# Patient Record
Sex: MALE | Race: BLACK OR AFRICAN AMERICAN | Employment: FULL TIME | URBAN - METROPOLITAN AREA
[De-identification: names, ages, dates, MRNs, and addresses within clinical notes are randomized per-mention and may not be internally consistent; named-entity substitution may affect disease eponyms.]

---

## 2019-09-26 ENCOUNTER — APPOINTMENT (OUTPATIENT)
Dept: GENERAL RADIOLOGY | Facility: HOSPITAL | Age: 64
End: 2019-09-26
Attending: EMERGENCY MEDICINE
Payer: OTHER MISCELLANEOUS

## 2019-09-26 ENCOUNTER — HOSPITAL ENCOUNTER (EMERGENCY)
Facility: HOSPITAL | Age: 64
Discharge: HOME | End: 2019-09-26
Attending: EMERGENCY MEDICINE
Payer: OTHER MISCELLANEOUS

## 2019-09-26 VITALS
HEIGHT: 71 IN | RESPIRATION RATE: 18 BRPM | TEMPERATURE: 97 F | HEART RATE: 72 BPM | SYSTOLIC BLOOD PRESSURE: 126 MMHG | OXYGEN SATURATION: 96 % | BODY MASS INDEX: 29.4 KG/M2 | DIASTOLIC BLOOD PRESSURE: 72 MMHG | WEIGHT: 210 LBS

## 2019-09-26 DIAGNOSIS — S61.309A AVULSION OF FINGERNAIL, INITIAL ENCOUNTER: ICD-10-CM

## 2019-09-26 DIAGNOSIS — S62.636A CLOSED DISPLACED FRACTURE OF DISTAL PHALANX OF RIGHT LITTLE FINGER, INITIAL ENCOUNTER: Primary | ICD-10-CM

## 2019-09-26 PROCEDURE — 99283 EMERGENCY DEPT VISIT LOW MDM: CPT

## 2019-09-26 PROCEDURE — 73140 X-RAY EXAM OF FINGER(S): CPT | Performed by: EMERGENCY MEDICINE

## 2019-09-26 PROCEDURE — 90471 IMMUNIZATION ADMIN: CPT

## 2019-09-26 RX ORDER — IBUPROFEN 400 MG/1
400 TABLET ORAL EVERY 8 HOURS PRN
Qty: 30 TABLET | Refills: 0 | Status: SHIPPED | OUTPATIENT
Start: 2019-09-26 | End: 2019-10-03

## 2019-09-26 RX ORDER — CEFADROXIL 500 MG/1
500 CAPSULE ORAL 2 TIMES DAILY
Qty: 10 CAPSULE | Refills: 0 | Status: SHIPPED | OUTPATIENT
Start: 2019-09-26 | End: 2019-10-01

## 2019-09-26 NOTE — ED INITIAL ASSESSMENT (HPI)
Pt presents to ed ambulatory with steady gait with an injury to the fifth digit of the right hand. Pt is a&ox4, moves all extremities well, resps easy.

## 2019-09-26 NOTE — ED PROVIDER NOTES
Patient Seen in: [facility] Emergency Department      History   Patient presents with:  Upper Extremity Injury (musculoskeletal)    Stated Complaint: pt c/o of right pink injury at work     HPI    40-year-old male complaint of right fifth finger radha back.  I did not see any obvious laceration of the nailbed that would have gone along with the patient's description that he reduce the fracture that was open. Tube gauze dressing was placed in a splint with the finger in extension was placed.         MDM

## 2019-09-27 PROBLEM — S62.609K: Status: ACTIVE | Noted: 2019-09-27

## 2019-09-27 PROBLEM — S69.91XA INJURY OF NAIL BED OF FINGER OF RIGHT HAND, INITIAL ENCOUNTER: Status: ACTIVE | Noted: 2019-09-27

## 2021-11-15 ENCOUNTER — NURSING HOME VISIT (OUTPATIENT)
Dept: REHABILITATION | Age: 66
End: 2021-11-15

## (undated) NOTE — ED AVS SNAPSHOT
Madie Pringle   MRN: [id number]    Department:  [facility] Emergency Department   Date of Visit:  9/26/2019           Disclosure     Insurance plans vary and the physician(s) referred by the ER may not be covered by your plan.  Please contact your i tell this physician (or your personal doctor if your instructions are to return to your personal doctor) about any new or lasting problems. The primary care or specialist physician will see patients referred from the [facility] Emergency Department.  Richard Hull

## (undated) NOTE — LETTER
September 26, 2019    Patient: Fredrick Silva   Date of Visit: 9/26/2019       To Whom It May Concern:    Winston Mendez was seen and treated in our emergency department on 9/26/2019.  He can return to work with these limitations: No work with the right hand